# Patient Record
Sex: FEMALE | Employment: OTHER
[De-identification: names, ages, dates, MRNs, and addresses within clinical notes are randomized per-mention and may not be internally consistent; named-entity substitution may affect disease eponyms.]

---

## 2021-11-01 ENCOUNTER — OFFICE VISIT (OUTPATIENT)
Age: 66
End: 2021-11-01

## 2021-12-01 ENCOUNTER — OFFICE VISIT (OUTPATIENT)
Age: 66
End: 2021-12-01

## 2021-12-27 ENCOUNTER — OFFICE VISIT (OUTPATIENT)
Age: 66
End: 2021-12-27

## 2021-12-27 ENCOUNTER — TELEPHONE ENCOUNTER (OUTPATIENT)
Dept: URBAN - METROPOLITAN AREA CLINIC 9 | Facility: CLINIC | Age: 66
End: 2021-12-27

## 2022-01-24 ENCOUNTER — TELEPHONE ENCOUNTER (OUTPATIENT)
Dept: URBAN - METROPOLITAN AREA CLINIC 9 | Facility: CLINIC | Age: 67
End: 2022-01-24

## 2022-01-24 ENCOUNTER — OFFICE VISIT (OUTPATIENT)
Dept: URBAN - METROPOLITAN AREA CLINIC 7 | Facility: CLINIC | Age: 67
End: 2022-01-24

## 2022-02-15 ENCOUNTER — OFFICE VISIT (OUTPATIENT)
Dept: URBAN - METROPOLITAN AREA CLINIC 7 | Facility: CLINIC | Age: 67
End: 2022-02-15

## 2022-02-15 ENCOUNTER — TELEPHONE ENCOUNTER (OUTPATIENT)
Dept: URBAN - METROPOLITAN AREA CLINIC 9 | Facility: CLINIC | Age: 67
End: 2022-02-15

## 2022-03-09 ENCOUNTER — OFFICE VISIT (OUTPATIENT)
Dept: URBAN - METROPOLITAN AREA SURGERY CENTER 5 | Facility: SURGERY CENTER | Age: 67
End: 2022-03-09

## 2022-07-30 ENCOUNTER — TELEPHONE ENCOUNTER (OUTPATIENT)
Age: 67
End: 2022-07-30

## 2022-07-30 RX ORDER — FAMOTIDINE 10 MG
1 (ONE) TABLET ORAL
Qty: 1 | Refills: 5 | OUTPATIENT
Start: 2018-07-19 | End: 2021-12-27

## 2022-07-31 ENCOUNTER — TELEPHONE ENCOUNTER (OUTPATIENT)
Age: 67
End: 2022-07-31

## 2022-07-31 RX ORDER — FAMOTIDINE 40 MG/1
1 (ONE) TABLET, FILM COATED ORAL DAILY
Qty: 0 | Refills: 5 | Status: ACTIVE | COMMUNITY
Start: 2022-02-15

## 2022-07-31 RX ORDER — FAMOTIDINE 10 MG
1 (ONE) TABLET ORAL
Qty: 1 | Refills: 5 | Status: ACTIVE | COMMUNITY
Start: 2018-07-19

## 2022-07-31 RX ORDER — FAMOTIDINE 10 MG
1 (ONE) TABLET ORAL
Qty: 1 | Refills: 5 | Status: ACTIVE | COMMUNITY
Start: 2018-02-28

## 2022-07-31 RX ORDER — FAMOTIDINE 10 MG
1 (ONE) TABLET ORAL
Qty: 1 | Refills: 5 | Status: ACTIVE | COMMUNITY
Start: 2018-07-18

## 2025-03-05 ENCOUNTER — P2P PATIENT RECORD (OUTPATIENT)
Age: 70
End: 2025-03-05

## 2025-06-27 ENCOUNTER — OFFICE VISIT (OUTPATIENT)
Dept: URBAN - METROPOLITAN AREA CLINIC 7 | Facility: CLINIC | Age: 70
End: 2025-06-27
Payer: MEDICARE

## 2025-06-27 ENCOUNTER — DASHBOARD ENCOUNTERS (OUTPATIENT)
Age: 70
End: 2025-06-27

## 2025-06-27 ENCOUNTER — TELEPHONE ENCOUNTER (OUTPATIENT)
Dept: URBAN - METROPOLITAN AREA CLINIC 7 | Facility: CLINIC | Age: 70
End: 2025-06-27

## 2025-06-27 DIAGNOSIS — K58.1 IRRITABLE BOWEL SYNDROME WITH CONSTIPATION: ICD-10-CM

## 2025-06-27 DIAGNOSIS — Z80.0 FAMILY HISTORY OF COLON CANCER: ICD-10-CM

## 2025-06-27 DIAGNOSIS — K57.90 DIVERTICULOSIS: ICD-10-CM

## 2025-06-27 PROCEDURE — 99203 OFFICE O/P NEW LOW 30 MIN: CPT | Performed by: INTERNAL MEDICINE

## 2025-06-27 RX ORDER — FAMOTIDINE 40 MG/1
1 (ONE) TABLET, FILM COATED ORAL DAILY
Qty: 0 | Refills: 5 | Status: ACTIVE | COMMUNITY
Start: 2022-02-15

## 2025-06-27 RX ORDER — LEVOTHYROXINE SODIUM 75 UG/1
1 TABLET IN THE MORNING ON AN EMPTY STOMACH TABLET ORAL ONCE A DAY
Status: ACTIVE | COMMUNITY

## 2025-06-27 RX ORDER — LOSARTAN POTASSIUM 50 MG/1
1 TABLET TABLET, FILM COATED ORAL ONCE A DAY
Status: ACTIVE | COMMUNITY

## 2025-06-27 RX ORDER — SERTRALINE 100 MG/1
1 TABLET TABLET, FILM COATED ORAL ONCE A DAY
Status: ACTIVE | COMMUNITY

## 2025-06-27 RX ORDER — METHENAMINE HIPPURATE 1000 MG/1
1 TABLET TABLET ORAL TWICE A DAY
Status: ACTIVE | COMMUNITY

## 2025-06-27 RX ORDER — TOPIRAMATE 50 MG/1
1 TABLET TABLET, FILM COATED ORAL ONCE A DAY
Status: ACTIVE | COMMUNITY

## 2025-06-27 RX ORDER — CLONAZEPAM 0.5 MG/1
1 TABLET TABLET ORAL TWICE A DAY
Status: ACTIVE | COMMUNITY

## 2025-06-27 RX ORDER — FAMOTIDINE 10 MG
1 (ONE) TABLET ORAL
Qty: 1 | Refills: 5 | Status: DISCONTINUED | COMMUNITY
Start: 2018-02-28

## 2025-06-27 NOTE — HPI-TODAY'S VISIT:
Patient was last seen in February 2022 and is new to me.  She was evaluated at that time for consideration of a screening colonoscopy given her family history of colon cancer as well as esophageal reflux.  EGD was done in March 2018 for dysphagia and suspected esophageal reflux which demonstrated a small hiatal hernia and variable Z-line which was biopsied and dilated and erythematous stomach and otherwise normal exam.  Biopsies were negative for H. pylori and negative for Fitzpatrick's esophagus.  Colonoscopy was done in March 2022 which demonstrated internal hemorrhoids and otherwise normal with a repeat recommended in 5 years.  She is essentially lost to follow-up since that time.  Recent labs in June 2025 demonstrated creatinine 0.5, normal FTs, hemoglobin of 14.2 with otherwise normal CBC. She is doing well, hx of CRC in mother/brother (in his 50s). No ongoing symptoms. Last colon in 2022.